# Patient Record
(demographics unavailable — no encounter records)

---

## 2025-01-23 NOTE — PLAN
[FreeTextEntry1] : -Unremarkable exam today -Pap not indicated until age 21 per [facility]  -Consider Gardasil, she will discuss with parents -Routine physical activity encouraged -RTO one year or sooner PRN for any new problems, questions or concerns

## 2025-01-23 NOTE — HISTORY OF PRESENT ILLNESS
[Y] : Patient uses contraception [Condoms] : uses condoms [N] : Patient denies prior pregnancies [Menarche Age: ____] : age at menarche was [unfilled] [Previously active] : previously active [Men] : men [No] : No [PapSmeardate] : n/a [GonorrheaDate] : n/a [ChlamydiaDate] : n/a [LMPDate] : 01/05/2025 [PGHxTotal] : 0 [FreeTextEntry1] : 01/05/2025 [FreeTextEntry2] : single

## 2025-01-23 NOTE — PHYSICAL EXAM
[Appropriately responsive] : appropriately responsive [Alert] : alert [No Acute Distress] : no acute distress [Soft] : soft [Non-tender] : non-tender [Non-distended] : non-distended [No Mass] : no mass [Oriented x3] : oriented x3 [Examination Of The Breasts] : a normal appearance [Normal] : normal [No Masses] : no breast masses were palpable [FreeTextEntry3] : normal thyroid [FreeTextEntry7] : no CVAT